# Patient Record
Sex: FEMALE | Race: WHITE | NOT HISPANIC OR LATINO | Employment: FULL TIME | URBAN - METROPOLITAN AREA
[De-identification: names, ages, dates, MRNs, and addresses within clinical notes are randomized per-mention and may not be internally consistent; named-entity substitution may affect disease eponyms.]

---

## 2023-07-17 ENCOUNTER — APPOINTMENT (OUTPATIENT)
Dept: PRIMARY CARE | Facility: CLINIC | Age: 38
End: 2023-07-17
Payer: COMMERCIAL

## 2023-07-28 LAB
ALANINE AMINOTRANSFERASE (SGPT) (U/L) IN SER/PLAS: 22 U/L (ref 7–45)
ALBUMIN (G/DL) IN SER/PLAS: 4 G/DL (ref 3.4–5)
ALKALINE PHOSPHATASE (U/L) IN SER/PLAS: 81 U/L (ref 33–110)
ANION GAP IN SER/PLAS: 12 MMOL/L (ref 10–20)
ASPARTATE AMINOTRANSFERASE (SGOT) (U/L) IN SER/PLAS: 16 U/L (ref 9–39)
BILIRUBIN TOTAL (MG/DL) IN SER/PLAS: 0.3 MG/DL (ref 0–1.2)
CALCIDIOL (25 OH VITAMIN D3) (NG/ML) IN SER/PLAS: 54 NG/ML
CALCIUM (MG/DL) IN SER/PLAS: 9.2 MG/DL (ref 8.6–10.6)
CARBON DIOXIDE, TOTAL (MMOL/L) IN SER/PLAS: 25 MMOL/L (ref 21–32)
CHLORIDE (MMOL/L) IN SER/PLAS: 106 MMOL/L (ref 98–107)
CHOLESTEROL (MG/DL) IN SER/PLAS: 202 MG/DL (ref 0–199)
CHOLESTEROL IN HDL (MG/DL) IN SER/PLAS: 45.5 MG/DL
CHOLESTEROL/HDL RATIO: 4.4
CREATININE (MG/DL) IN SER/PLAS: 0.9 MG/DL (ref 0.5–1.05)
ERYTHROCYTE DISTRIBUTION WIDTH (RATIO) BY AUTOMATED COUNT: 13.2 % (ref 11.5–14.5)
ERYTHROCYTE MEAN CORPUSCULAR HEMOGLOBIN CONCENTRATION (G/DL) BY AUTOMATED: 31.8 G/DL (ref 32–36)
ERYTHROCYTE MEAN CORPUSCULAR VOLUME (FL) BY AUTOMATED COUNT: 87 FL (ref 80–100)
ERYTHROCYTES (10*6/UL) IN BLOOD BY AUTOMATED COUNT: 4.74 X10E12/L (ref 4–5.2)
GFR FEMALE: 85 ML/MIN/1.73M2
GLUCOSE (MG/DL) IN SER/PLAS: 80 MG/DL (ref 74–99)
HEMATOCRIT (%) IN BLOOD BY AUTOMATED COUNT: 41.2 % (ref 36–46)
HEMOGLOBIN (G/DL) IN BLOOD: 13.1 G/DL (ref 12–16)
LDL: 130 MG/DL (ref 0–99)
LEUKOCYTES (10*3/UL) IN BLOOD BY AUTOMATED COUNT: 8.9 X10E9/L (ref 4.4–11.3)
NRBC (PER 100 WBCS) BY AUTOMATED COUNT: 0 /100 WBC (ref 0–0)
PLATELETS (10*3/UL) IN BLOOD AUTOMATED COUNT: 315 X10E9/L (ref 150–450)
POTASSIUM (MMOL/L) IN SER/PLAS: 4.5 MMOL/L (ref 3.5–5.3)
PROTEIN TOTAL: 7 G/DL (ref 6.4–8.2)
SODIUM (MMOL/L) IN SER/PLAS: 138 MMOL/L (ref 136–145)
THYROTROPIN (MIU/L) IN SER/PLAS BY DETECTION LIMIT <= 0.05 MIU/L: 3.4 MIU/L (ref 0.44–3.98)
TRIGLYCERIDE (MG/DL) IN SER/PLAS: 131 MG/DL (ref 0–149)
UREA NITROGEN (MG/DL) IN SER/PLAS: 16 MG/DL (ref 6–23)
VLDL: 26 MG/DL (ref 0–40)

## 2023-08-01 ENCOUNTER — OFFICE VISIT (OUTPATIENT)
Dept: PRIMARY CARE | Facility: CLINIC | Age: 38
End: 2023-08-01
Payer: COMMERCIAL

## 2023-08-01 VITALS
DIASTOLIC BLOOD PRESSURE: 71 MMHG | SYSTOLIC BLOOD PRESSURE: 117 MMHG | WEIGHT: 268.4 LBS | HEART RATE: 72 BPM | BODY MASS INDEX: 44.72 KG/M2 | HEIGHT: 65 IN | OXYGEN SATURATION: 97 %

## 2023-08-01 DIAGNOSIS — E78.00 HYPERCHOLESTEROLEMIA: ICD-10-CM

## 2023-08-01 DIAGNOSIS — E03.9 HYPOTHYROIDISM, UNSPECIFIED TYPE: Primary | ICD-10-CM

## 2023-08-01 DIAGNOSIS — F41.1 GENERALIZED ANXIETY DISORDER: ICD-10-CM

## 2023-08-01 PROBLEM — F43.0 ACUTE REACTION TO STRESS: Status: ACTIVE | Noted: 2023-08-01

## 2023-08-01 PROBLEM — E66.9 OBESITY: Status: ACTIVE | Noted: 2023-08-01

## 2023-08-01 PROBLEM — L30.9 ECZEMA: Status: ACTIVE | Noted: 2023-08-01

## 2023-08-01 PROBLEM — R03.0 ELEVATED BLOOD PRESSURE READING WITHOUT DIAGNOSIS OF HYPERTENSION: Status: ACTIVE | Noted: 2023-08-01

## 2023-08-01 PROBLEM — F98.8 ATTENTION DEFICIT DISORDER WITHOUT HYPERACTIVITY: Status: ACTIVE | Noted: 2023-08-01

## 2023-08-01 PROBLEM — G47.00 INSOMNIA: Status: ACTIVE | Noted: 2023-08-01

## 2023-08-01 PROBLEM — K21.9 ESOPHAGEAL REFLUX: Status: ACTIVE | Noted: 2023-08-01

## 2023-08-01 PROCEDURE — 99214 OFFICE O/P EST MOD 30 MIN: CPT | Performed by: INTERNAL MEDICINE

## 2023-08-01 PROCEDURE — 1036F TOBACCO NON-USER: CPT | Performed by: INTERNAL MEDICINE

## 2023-08-01 RX ORDER — LEVOTHYROXINE SODIUM 125 UG/1
125 TABLET ORAL DAILY
Qty: 90 TABLET | Refills: 3 | Status: SHIPPED | OUTPATIENT
Start: 2023-08-01 | End: 2024-04-09 | Stop reason: SDUPTHER

## 2023-08-01 RX ORDER — ESCITALOPRAM OXALATE 10 MG/1
10 TABLET ORAL
COMMUNITY
Start: 2020-04-01 | End: 2023-08-01 | Stop reason: SDUPTHER

## 2023-08-01 RX ORDER — LEVOTHYROXINE SODIUM 125 UG/1
125 TABLET ORAL DAILY
COMMUNITY
End: 2023-08-01 | Stop reason: SDUPTHER

## 2023-08-01 RX ORDER — CLOTRIMAZOLE AND BETAMETHASONE DIPROPIONATE 10; .64 MG/G; MG/G
CREAM TOPICAL 2 TIMES DAILY
COMMUNITY
Start: 2022-07-27

## 2023-08-01 RX ORDER — ESCITALOPRAM OXALATE 10 MG/1
10 TABLET ORAL DAILY
Qty: 90 TABLET | Refills: 3 | Status: SHIPPED | OUTPATIENT
Start: 2023-08-01 | End: 2024-04-09 | Stop reason: SDUPTHER

## 2023-08-01 RX ORDER — MODERNA COVID-19 VACCINE, BIVALENT 25; 25 UG/.5ML; UG/.5ML
INJECTION, SUSPENSION INTRAMUSCULAR
COMMUNITY
Start: 2022-10-24 | End: 2023-08-01 | Stop reason: ALTCHOICE

## 2023-08-01 RX ORDER — ERGOCALCIFEROL (VITAMIN D2) 200 MCG/ML
DROPS ORAL
COMMUNITY

## 2023-08-01 ASSESSMENT — ENCOUNTER SYMPTOMS
DIFFICULTY URINATING: 0
DYSURIA: 0
DIZZINESS: 0
NAUSEA: 0
VOMITING: 0
FEVER: 0
HEMATURIA: 0
FATIGUE: 0
CONSTIPATION: 0
SORE THROAT: 0
DIARRHEA: 0
ARTHRALGIAS: 0
WEAKNESS: 0
LIGHT-HEADEDNESS: 0
PALPITATIONS: 0
FREQUENCY: 0
SHORTNESS OF BREATH: 0
HEADACHES: 0
CHILLS: 0
MYALGIAS: 0
COUGH: 0

## 2023-08-01 ASSESSMENT — PATIENT HEALTH QUESTIONNAIRE - PHQ9
SUM OF ALL RESPONSES TO PHQ9 QUESTIONS 1 AND 2: 0
1. LITTLE INTEREST OR PLEASURE IN DOING THINGS: NOT AT ALL
2. FEELING DOWN, DEPRESSED OR HOPELESS: NOT AT ALL

## 2023-08-01 ASSESSMENT — PAIN SCALES - GENERAL: PAINLEVEL: 0-NO PAIN

## 2023-08-01 NOTE — ASSESSMENT & PLAN NOTE
TSH is stable and unchanged so she will stay on the levothyroxine 125 mcg daily.  She was given refill.

## 2023-08-01 NOTE — PROGRESS NOTES
Subjective   Patient ID: Kylee Garza is a 37 y.o. female who presents for 6 month follow-up for med refill and thyroid management.  BN    Patient is here for routine follow-up for her thyroid disease, anxiety and medication management.  Overall patient feels like she is doing well and has no new complaints.  Complete blood work was just finished and reviewed with the patient in person today.  Patient's LDL cholesterol is 130 and she continues to work on her diet.  She has not lost any weight but her cholesterol levels are clearly improving so I encouraged her to work on both weight loss and her low-fat diet.        Review of Systems   Constitutional:  Negative for chills, fatigue and fever.   HENT:  Negative for sore throat.    Eyes:  Negative for visual disturbance.   Respiratory:  Negative for cough and shortness of breath.    Cardiovascular:  Negative for chest pain, palpitations and leg swelling.   Gastrointestinal:  Negative for constipation, diarrhea, nausea and vomiting.   Genitourinary:  Negative for difficulty urinating, dysuria, frequency, hematuria and urgency.   Musculoskeletal:  Negative for arthralgias and myalgias.   Skin:  Negative for rash.   Neurological:  Negative for dizziness, syncope, weakness, light-headedness and headaches.       Objective   Medication Documentation Review Audit       Reviewed by Sofiya Almonte MD (Physician) on 08/01/23 at 0839      Medication Order Taking? Sig Documenting Provider Last Dose Status   BIOTIN ORAL [id number]  Take by mouth. Historical Provider, MD  Active   clotrimazole-betamethasone (Lotrisone) cream [id number] Yes twice a day. Historical Provider, MD  Active   collagen, hydrolysate, bovine, (COLLAGEN, HYDR, BOVINE,, BULK, MISC) [id number]  Take by mouth. Historical Provider, MD  Active   ergocalciferol (Vitamin D-2) 200 mcg/mL (8,000 unit/mL) drops [id number]  Take by mouth. Historical Provider, MD  Active   escitalopram (Lexapro) 10 mg tablet [id number] Yes 1  "tablet (10 mg). Historical Provider, MD  Active   levothyroxine (Synthroid, Levoxyl) 125 mcg tablet [id number]  Take 1 tablet (125 mcg) by mouth once daily. Historical Provider, MD  Active     Discontinued 08/01/23 0839                  Physical Exam  Constitutional:       Appearance: Normal appearance.   HENT:      Head: Normocephalic and atraumatic.      Nose: Nose normal.   Eyes:      Extraocular Movements: Extraocular movements intact.      Pupils: Pupils are equal, round, and reactive to light.   Cardiovascular:      Rate and Rhythm: Normal rate and regular rhythm.   Pulmonary:      Breath sounds: Normal breath sounds.   Abdominal:      General: Abdomen is flat. Bowel sounds are normal.      Palpations: Abdomen is soft.   Musculoskeletal:      Right lower leg: No edema.      Left lower leg: No edema.   Neurological:      Mental Status: She is alert.     /71 (BP Location: Left arm, Patient Position: Sitting)   Pulse 72   Ht 1.638 m (5' 4.5\")   Wt 122 kg (268 lb 6.4 oz)   SpO2 97%   BMI 45.36 kg/m²       Assessment/Plan   Problem List Items Addressed This Visit       Generalized anxiety disorder     Patient states that her anxiety is stable on the S-Citalopram and she was given a refill.         Relevant Medications    escitalopram (Lexapro) 10 mg tablet    Hypercholesterolemia     Patient will continue to work on diet.  Her LDL cholesterol is down to 130 which is improved but still elevated.  She is aware she needs to continue working on dietary changes         Hypothyroidism - Primary     TSH is stable and unchanged so she will stay on the levothyroxine 125 mcg daily.  She was given refill.         Relevant Medications    levothyroxine (Synthroid, Levoxyl) 125 mcg tablet              It has been a pleasure seeing you.    "

## 2023-08-01 NOTE — ASSESSMENT & PLAN NOTE
Patient will continue to work on diet.  Her LDL cholesterol is down to 130 which is improved but still elevated.  She is aware she needs to continue working on dietary changes

## 2024-02-16 ENCOUNTER — APPOINTMENT (OUTPATIENT)
Dept: PRIMARY CARE | Facility: CLINIC | Age: 39
End: 2024-02-16
Payer: COMMERCIAL

## 2024-03-06 ENCOUNTER — APPOINTMENT (OUTPATIENT)
Dept: PRIMARY CARE | Facility: CLINIC | Age: 39
End: 2024-03-06
Payer: COMMERCIAL

## 2024-03-11 ENCOUNTER — TELEPHONE (OUTPATIENT)
Dept: PRIMARY CARE | Facility: CLINIC | Age: 39
End: 2024-03-11
Payer: COMMERCIAL

## 2024-04-09 ENCOUNTER — OFFICE VISIT (OUTPATIENT)
Dept: PRIMARY CARE | Facility: CLINIC | Age: 39
End: 2024-04-09
Payer: COMMERCIAL

## 2024-04-09 VITALS
BODY MASS INDEX: 45.02 KG/M2 | WEIGHT: 270.2 LBS | HEIGHT: 65 IN | DIASTOLIC BLOOD PRESSURE: 84 MMHG | SYSTOLIC BLOOD PRESSURE: 120 MMHG | HEART RATE: 67 BPM | OXYGEN SATURATION: 97 %

## 2024-04-09 DIAGNOSIS — K21.9 GASTROESOPHAGEAL REFLUX DISEASE WITHOUT ESOPHAGITIS: ICD-10-CM

## 2024-04-09 DIAGNOSIS — E03.9 HYPOTHYROIDISM, UNSPECIFIED TYPE: ICD-10-CM

## 2024-04-09 DIAGNOSIS — E55.9 VITAMIN D DEFICIENCY: ICD-10-CM

## 2024-04-09 DIAGNOSIS — E78.00 HYPERCHOLESTEROLEMIA: Primary | ICD-10-CM

## 2024-04-09 DIAGNOSIS — Z00.00 ANNUAL PHYSICAL EXAM: ICD-10-CM

## 2024-04-09 DIAGNOSIS — F41.1 GENERALIZED ANXIETY DISORDER: ICD-10-CM

## 2024-04-09 PROCEDURE — 99214 OFFICE O/P EST MOD 30 MIN: CPT | Performed by: INTERNAL MEDICINE

## 2024-04-09 PROCEDURE — 1036F TOBACCO NON-USER: CPT | Performed by: INTERNAL MEDICINE

## 2024-04-09 RX ORDER — LEVOTHYROXINE SODIUM 125 UG/1
125 TABLET ORAL DAILY
Qty: 90 TABLET | Refills: 3 | Status: SHIPPED | OUTPATIENT
Start: 2024-04-09

## 2024-04-09 RX ORDER — ESCITALOPRAM OXALATE 10 MG/1
10 TABLET ORAL DAILY
Qty: 90 TABLET | Refills: 3 | Status: SHIPPED | OUTPATIENT
Start: 2024-04-09

## 2024-04-09 ASSESSMENT — ENCOUNTER SYMPTOMS
CONSTIPATION: 0
COUGH: 0
DIZZINESS: 0
FREQUENCY: 0
DYSURIA: 0
VOMITING: 0
DIARRHEA: 0
SHORTNESS OF BREATH: 0
NAUSEA: 0
FATIGUE: 0
FEVER: 0
SORE THROAT: 0
DIFFICULTY URINATING: 0
HEADACHES: 0
WEAKNESS: 0
CHILLS: 0
MYALGIAS: 0
LIGHT-HEADEDNESS: 0
HEMATURIA: 0
CHEST TIGHTNESS: 0
PALPITATIONS: 0
ARTHRALGIAS: 0

## 2024-04-09 ASSESSMENT — PAIN SCALES - GENERAL: PAINLEVEL: 0-NO PAIN

## 2024-04-09 NOTE — PROGRESS NOTES
Subjective   Patient ID: Kylee Garza is a 38 y.o. female who presents for No chief complaint on file..  Kylee is here for a 6 month follow up for hypothyroidism and anxiety. She is doing very well today and has no concerns at this time. She recently had Lasik eye surgery in January and is happy with the results. Patient regularly sees her OBGYN Dr Tucker through [facility] in [place].         Review of Systems   Constitutional:  Negative for chills, fatigue and fever.   HENT:  Negative for sore throat.    Eyes:  Negative for visual disturbance.   Respiratory:  Negative for cough, chest tightness and shortness of breath.    Cardiovascular:  Negative for chest pain, palpitations and leg swelling.   Gastrointestinal:  Negative for constipation, diarrhea, nausea and vomiting.   Genitourinary:  Negative for difficulty urinating, dysuria, frequency, hematuria and urgency.   Musculoskeletal:  Negative for arthralgias and myalgias.   Skin:  Negative for rash.   Neurological:  Negative for dizziness, syncope, weakness, light-headedness and headaches.       Objective   Medication Documentation Review Audit       Reviewed by Sofiya Almonte MD (Physician) on 04/09/24 at 1520      Medication Order Taking? Sig Documenting Provider Last Dose Status   BIOTIN ORAL [id number]  Take by mouth. Historical Provider, MD  Active   clotrimazole-betamethasone (Lotrisone) cream [id number]  twice a day. Historical Provider, MD  Active   collagen, hydrolysate, bovine, (COLLAGEN, HYDR, BOVINE,, BULK, MISC) [id number]  Take by mouth. Historical Provider, MD  Active   ergocalciferol (Vitamin D-2) 200 mcg/mL (8,000 unit/mL) drops [id number]  Take by mouth. Historical Provider, MD  Active   escitalopram (Lexapro) 10 mg tablet [id number]  Take 1 tablet (10 mg) by mouth once daily. Sofiya Almonte MD  Active   levothyroxine (Synthroid, Levoxyl) 125 mcg tablet [id number]  Take 1 tablet (125 mcg) by mouth once daily. Sofiya Almonte MD  Active                  No  "Known Allergies  Physical Exam  Constitutional:       General: She is not in acute distress.     Appearance: Normal appearance. She is obese. She is not ill-appearing.   HENT:      Head: Normocephalic and atraumatic.      Nose: Nose normal.      Mouth/Throat:      Mouth: Mucous membranes are moist.   Eyes:      Extraocular Movements: Extraocular movements intact.      Conjunctiva/sclera: Conjunctivae normal.      Pupils: Pupils are equal, round, and reactive to light.   Cardiovascular:      Rate and Rhythm: Normal rate and regular rhythm.      Pulses: Normal pulses.      Heart sounds: Normal heart sounds.   Pulmonary:      Effort: Pulmonary effort is normal.      Breath sounds: Normal breath sounds.   Abdominal:      General: Abdomen is flat. Bowel sounds are normal.      Palpations: Abdomen is soft.   Musculoskeletal:      Right lower leg: No edema.      Left lower leg: No edema.   Skin:     General: Skin is warm.   Neurological:      General: No focal deficit present.      Mental Status: She is alert and oriented to person, place, and time. Mental status is at baseline.   Psychiatric:         Mood and Affect: Mood normal.         Behavior: Behavior normal.         Thought Content: Thought content normal.         Judgment: Judgment normal.     /84 (BP Location: Left arm, Patient Position: Sitting)   Pulse 67   Ht 1.651 m (5' 5\") Comment: with shoes on  Wt 123 kg (270 lb 3.2 oz)   SpO2 97%   BMI 44.96 kg/m²       Assessment/Plan   Problem List Items Addressed This Visit       Esophageal reflux     Patient is no longer having symptoms now that she has a less stressful job.  She denies any reflux or heartburn         Generalized anxiety disorder     Stable and well-controlled on lexapro 10 mg daily. Refill sent today          Relevant Medications    escitalopram (Lexapro) 10 mg tablet    Hypercholesterolemia - Primary     Fasting labs to be drawn after July 23. Continue low fat diet and exercising " regularly.   Cholesterol controlled without medication at this time.          Relevant Orders    Lipid panel    Hypothyroidism     Stable on Synthroid. TSH to be drawn after July 23, 2024.   Refill sent today.          Relevant Medications    levothyroxine (Synthroid, Levoxyl) 125 mcg tablet     Other Visit Diagnoses       Annual physical exam        Relevant Orders    CBC    Comprehensive metabolic panel    Tsh With Reflex To Free T4 If Abnormal    Lipid panel    Vitamin D deficiency        Relevant Orders    Vitamin D 25-Hydroxy,Total (for eval of Vitamin D levels)          Please follow up with Dr Almonte in 6 months for follow up hypothyroidism, anxiety, and cholesterol.   Please get fasting labs after July 23.          It has been a pleasure seeing you.  Sofiya Almonte MD

## 2024-04-09 NOTE — ASSESSMENT & PLAN NOTE
Patient is no longer having symptoms now that she has a less stressful job.  She denies any reflux or heartburn

## 2024-04-09 NOTE — ASSESSMENT & PLAN NOTE
Fasting labs to be drawn after July 23. Continue low fat diet and exercising regularly.   Cholesterol controlled without medication at this time.

## 2024-10-08 ENCOUNTER — APPOINTMENT (OUTPATIENT)
Dept: PRIMARY CARE | Facility: CLINIC | Age: 39
End: 2024-10-08
Payer: COMMERCIAL

## 2024-10-10 ENCOUNTER — APPOINTMENT (OUTPATIENT)
Dept: PRIMARY CARE | Facility: CLINIC | Age: 39
End: 2024-10-10
Payer: COMMERCIAL

## 2024-10-10 VITALS
OXYGEN SATURATION: 98 % | BODY MASS INDEX: 38.19 KG/M2 | RESPIRATION RATE: 16 BRPM | HEIGHT: 65 IN | SYSTOLIC BLOOD PRESSURE: 107 MMHG | DIASTOLIC BLOOD PRESSURE: 77 MMHG | HEART RATE: 74 BPM | WEIGHT: 229.2 LBS

## 2024-10-10 DIAGNOSIS — E78.00 HYPERCHOLESTEROLEMIA: ICD-10-CM

## 2024-10-10 DIAGNOSIS — E03.9 HYPOTHYROIDISM, UNSPECIFIED TYPE: ICD-10-CM

## 2024-10-10 DIAGNOSIS — Z00.00 ANNUAL PHYSICAL EXAM: Primary | ICD-10-CM

## 2024-10-10 DIAGNOSIS — Z23 FLU VACCINE NEED: ICD-10-CM

## 2024-10-10 DIAGNOSIS — F41.1 GENERALIZED ANXIETY DISORDER: ICD-10-CM

## 2024-10-10 PROCEDURE — 3008F BODY MASS INDEX DOCD: CPT | Performed by: INTERNAL MEDICINE

## 2024-10-10 PROCEDURE — 90471 IMMUNIZATION ADMIN: CPT | Performed by: INTERNAL MEDICINE

## 2024-10-10 PROCEDURE — 90656 IIV3 VACC NO PRSV 0.5 ML IM: CPT | Performed by: INTERNAL MEDICINE

## 2024-10-10 PROCEDURE — 1036F TOBACCO NON-USER: CPT | Performed by: INTERNAL MEDICINE

## 2024-10-10 PROCEDURE — 99214 OFFICE O/P EST MOD 30 MIN: CPT | Performed by: INTERNAL MEDICINE

## 2024-10-10 ASSESSMENT — ENCOUNTER SYMPTOMS
ARTHRALGIAS: 0
FEVER: 0
SORE THROAT: 0
ABDOMINAL PAIN: 0
LIGHT-HEADEDNESS: 0
COUGH: 0
DYSURIA: 0
DIARRHEA: 0
TROUBLE SWALLOWING: 0
FATIGUE: 0
FREQUENCY: 0
DIZZINESS: 0
CONSTIPATION: 0
NAUSEA: 0
VOMITING: 0
SHORTNESS OF BREATH: 0
PALPITATIONS: 0

## 2024-10-10 ASSESSMENT — PATIENT HEALTH QUESTIONNAIRE - PHQ9
1. LITTLE INTEREST OR PLEASURE IN DOING THINGS: NOT AT ALL
SUM OF ALL RESPONSES TO PHQ9 QUESTIONS 1 AND 2: 0
2. FEELING DOWN, DEPRESSED OR HOPELESS: NOT AT ALL

## 2024-10-10 ASSESSMENT — PAIN SCALES - GENERAL: PAINLEVEL: 0-NO PAIN

## 2024-10-10 NOTE — ASSESSMENT & PLAN NOTE
Patient's cholesterol is due in April.  She was given a requisition and will have fasting labs before her next appointment in 6 months.

## 2024-10-10 NOTE — PATIENT INSTRUCTIONS
Follow up Dr Almonte in 6 months 30 min appointment    Get fasting labs before next appointment

## 2024-10-10 NOTE — PROGRESS NOTES
"Subjective   Patient ID: Kylee Garza is a 38 y.o. female who presents for No chief complaint on file..  Patient is here for routine follow-up on her cholesterol thyroid disease reflux and medication management.  She feels well overall and has no new complaints.          Review of Systems   Constitutional:  Negative for fatigue and fever.   HENT:  Negative for sore throat and trouble swallowing.    Eyes:  Negative for visual disturbance.   Respiratory:  Negative for cough and shortness of breath.    Cardiovascular:  Negative for chest pain, palpitations and leg swelling.   Gastrointestinal:  Negative for abdominal pain, constipation, diarrhea, nausea and vomiting.   Genitourinary:  Negative for dysuria and frequency.   Musculoskeletal:  Negative for arthralgias.   Skin:  Negative for rash.   Neurological:  Negative for dizziness and light-headedness.       Objective   Medication Documentation Review Audit       Reviewed by Sofiya Almonte MD (Physician) on 10/10/24 at 1029      Medication Order Taking? Sig Documenting Provider Last Dose Status   BIOTIN ORAL [id number]  Take by mouth. Historical Provider, MD  Active   clotrimazole-betamethasone (Lotrisone) cream [id number]  twice a day. Historical Provider, MD  Active   collagen, hydrolysate, bovine, (COLLAGEN, HYDR, BOVINE,, BULK, MISC) [id number]  Take by mouth. Historical Provider, MD  Active   ergocalciferol (Vitamin D-2) 200 mcg/mL (8,000 unit/mL) drops [id number]  Take by mouth. Historical Provider, MD  Active   escitalopram (Lexapro) 10 mg tablet [id number]  Take 1 tablet (10 mg) by mouth once daily. Sofiya Almonte MD  Active   levothyroxine (Synthroid, Levoxyl) 125 mcg tablet [id number]  Take 1 tablet (125 mcg) by mouth once daily. Sofiya Almonte MD  Active                  No Known Allergies    /77   Pulse 74   Resp 16   Ht 1.651 m (5' 5\")   Wt 104 kg (229 lb 3.2 oz)   SpO2 98%   BMI 38.14 kg/m²     Physical Exam  Constitutional:       Appearance: " Normal appearance.   HENT:      Head: Normocephalic and atraumatic.      Nose: Nose normal.   Eyes:      Extraocular Movements: Extraocular movements intact.      Pupils: Pupils are equal, round, and reactive to light.   Cardiovascular:      Rate and Rhythm: Normal rate and regular rhythm.   Pulmonary:      Breath sounds: Normal breath sounds.   Abdominal:      General: Abdomen is flat. Bowel sounds are normal.      Palpations: Abdomen is soft.   Musculoskeletal:      Right lower leg: No edema.      Left lower leg: No edema.   Neurological:      Mental Status: She is alert.           Assessment/Plan   Problem List Items Addressed This Visit       Generalized anxiety disorder     Patient's anxiety is stable on the Lexapro 10 mg daily and she does not want to change it.         Hypercholesterolemia     Patient's cholesterol is due in April.  She was given a requisition and will have fasting labs before her next appointment in 6 months.         Hypothyroidism     Patient is still stable on her levothyroxine 125 mcg daily.  Annual labs are due in April 2025          Other Visit Diagnoses       Annual physical exam    -  Primary    Relevant Orders    Lipid Panel    CBC    Comprehensive Metabolic Panel    TSH with reflex to Free T4 if abnormal    Vitamin D 25-Hydroxy,Total (for eval of Vitamin D levels)    Flu vaccine need        Relevant Orders    Flu vaccine, trivalent, preservative free, age 6 months and greater (Fluarix/Fluzone/Flulaval) (Completed)          Patient will get a flu shot today.  She will follow-up with me in 6 months and get annual blood work prior to that appointment  Patient continues to see GYN Dr. Marks         It has been a pleasure seeing you.  Sofiya Almonte MD

## 2025-01-15 DIAGNOSIS — T75.3XXA MOTION SICKNESS, INITIAL ENCOUNTER: Primary | ICD-10-CM

## 2025-01-15 RX ORDER — ONDANSETRON 8 MG/1
8 TABLET, ORALLY DISINTEGRATING ORAL EVERY 8 HOURS PRN
Qty: 20 TABLET | Refills: 0 | Status: SHIPPED | OUTPATIENT
Start: 2025-01-15 | End: 2025-01-22

## 2025-03-31 DIAGNOSIS — E03.9 HYPOTHYROIDISM, UNSPECIFIED TYPE: ICD-10-CM

## 2025-04-06 RX ORDER — LEVOTHYROXINE SODIUM 125 UG/1
125 TABLET ORAL DAILY
Qty: 90 TABLET | Refills: 4 | OUTPATIENT
Start: 2025-04-06

## 2025-04-09 LAB
25(OH)D3+25(OH)D2 SERPL-MCNC: 59 NG/ML (ref 30–100)
ALBUMIN SERPL-MCNC: 4.2 G/DL (ref 3.6–5.1)
ALP SERPL-CCNC: 78 U/L (ref 31–125)
ALT SERPL-CCNC: 8 U/L (ref 6–29)
ANION GAP SERPL CALCULATED.4IONS-SCNC: 8 MMOL/L (CALC) (ref 7–17)
AST SERPL-CCNC: 9 U/L (ref 10–30)
BILIRUB SERPL-MCNC: 0.4 MG/DL (ref 0.2–1.2)
BUN SERPL-MCNC: 15 MG/DL (ref 7–25)
CALCIUM SERPL-MCNC: 8.9 MG/DL (ref 8.6–10.2)
CHLORIDE SERPL-SCNC: 105 MMOL/L (ref 98–110)
CHOLEST SERPL-MCNC: 242 MG/DL
CHOLEST/HDLC SERPL: 4.8 (CALC)
CO2 SERPL-SCNC: 25 MMOL/L (ref 20–32)
CREAT SERPL-MCNC: 0.84 MG/DL (ref 0.5–0.97)
EGFRCR SERPLBLD CKD-EPI 2021: 92 ML/MIN/1.73M2
ERYTHROCYTE [DISTWIDTH] IN BLOOD BY AUTOMATED COUNT: 14.1 % (ref 11–15)
GLUCOSE SERPL-MCNC: 83 MG/DL (ref 65–99)
HCT VFR BLD AUTO: 43.5 % (ref 35–45)
HDLC SERPL-MCNC: 50 MG/DL
HGB BLD-MCNC: 13.9 G/DL (ref 11.7–15.5)
LDLC SERPL CALC-MCNC: 171 MG/DL (CALC)
MCH RBC QN AUTO: 26.8 PG (ref 27–33)
MCHC RBC AUTO-ENTMCNC: 32 G/DL (ref 32–36)
MCV RBC AUTO: 83.8 FL (ref 80–100)
NONHDLC SERPL-MCNC: 192 MG/DL (CALC)
PLATELET # BLD AUTO: 303 THOUSAND/UL (ref 140–400)
PMV BLD REES-ECKER: 10.7 FL (ref 7.5–12.5)
POTASSIUM SERPL-SCNC: 4.3 MMOL/L (ref 3.5–5.3)
PROT SERPL-MCNC: 7 G/DL (ref 6.1–8.1)
RBC # BLD AUTO: 5.19 MILLION/UL (ref 3.8–5.1)
SODIUM SERPL-SCNC: 138 MMOL/L (ref 135–146)
TRIGL SERPL-MCNC: 95 MG/DL
TSH SERPL-ACNC: 2.25 MIU/L
WBC # BLD AUTO: 7.6 THOUSAND/UL (ref 3.8–10.8)

## 2025-04-10 ENCOUNTER — APPOINTMENT (OUTPATIENT)
Dept: PRIMARY CARE | Facility: CLINIC | Age: 40
End: 2025-04-10
Payer: COMMERCIAL

## 2025-04-10 ENCOUNTER — TELEPHONE (OUTPATIENT)
Dept: PRIMARY CARE | Facility: CLINIC | Age: 40
End: 2025-04-10

## 2025-04-10 NOTE — TELEPHONE ENCOUNTER
----- Message from Sofiya Almonte sent at 4/10/2025  8:06 AM EDT -----  Notify patient her cholesterol specific LDL is quite elevated 171.  Please encourage her to work on a low-fat diet.  Other labs all looked okay and/or were normal and I will review all of this with her at her appointment later this month

## 2025-04-17 ENCOUNTER — APPOINTMENT (OUTPATIENT)
Dept: PRIMARY CARE | Facility: CLINIC | Age: 40
End: 2025-04-17
Payer: COMMERCIAL

## 2025-04-17 VITALS
SYSTOLIC BLOOD PRESSURE: 124 MMHG | OXYGEN SATURATION: 97 % | BODY MASS INDEX: 38.19 KG/M2 | WEIGHT: 229.2 LBS | HEART RATE: 67 BPM | HEIGHT: 65 IN | DIASTOLIC BLOOD PRESSURE: 76 MMHG

## 2025-04-17 DIAGNOSIS — F98.8 ATTENTION DEFICIT DISORDER WITHOUT HYPERACTIVITY: ICD-10-CM

## 2025-04-17 DIAGNOSIS — E78.00 HYPERCHOLESTEROLEMIA: ICD-10-CM

## 2025-04-17 DIAGNOSIS — E78.5 HYPERLIPIDEMIA, UNSPECIFIED HYPERLIPIDEMIA TYPE: ICD-10-CM

## 2025-04-17 DIAGNOSIS — K21.9 GASTROESOPHAGEAL REFLUX DISEASE WITHOUT ESOPHAGITIS: ICD-10-CM

## 2025-04-17 DIAGNOSIS — E03.9 HYPOTHYROIDISM, UNSPECIFIED TYPE: ICD-10-CM

## 2025-04-17 DIAGNOSIS — F41.1 GENERALIZED ANXIETY DISORDER: ICD-10-CM

## 2025-04-17 DIAGNOSIS — L30.9 ECZEMA, UNSPECIFIED TYPE: Primary | ICD-10-CM

## 2025-04-17 PROCEDURE — 1036F TOBACCO NON-USER: CPT | Performed by: INTERNAL MEDICINE

## 2025-04-17 PROCEDURE — 99214 OFFICE O/P EST MOD 30 MIN: CPT | Performed by: INTERNAL MEDICINE

## 2025-04-17 PROCEDURE — 3008F BODY MASS INDEX DOCD: CPT | Performed by: INTERNAL MEDICINE

## 2025-04-17 RX ORDER — ESCITALOPRAM OXALATE 10 MG/1
10 TABLET ORAL DAILY
Qty: 90 TABLET | Refills: 3 | Status: SHIPPED | OUTPATIENT
Start: 2025-04-17

## 2025-04-17 RX ORDER — ATOMOXETINE 40 MG/1
40 CAPSULE ORAL DAILY
Qty: 30 CAPSULE | Refills: 1 | Status: SHIPPED | OUTPATIENT
Start: 2025-04-17 | End: 2025-05-17

## 2025-04-17 RX ORDER — LEVOTHYROXINE SODIUM 125 UG/1
125 TABLET ORAL DAILY
Qty: 90 TABLET | Refills: 3 | Status: SHIPPED | OUTPATIENT
Start: 2025-04-17

## 2025-04-17 RX ORDER — CLOTRIMAZOLE AND BETAMETHASONE DIPROPIONATE 10; .64 MG/G; MG/G
CREAM TOPICAL 2 TIMES DAILY
Qty: 45 G | Refills: 3 | Status: SHIPPED | OUTPATIENT
Start: 2025-04-17

## 2025-04-17 ASSESSMENT — ENCOUNTER SYMPTOMS
COUGH: 0
VOMITING: 0
FREQUENCY: 0
ARTHRALGIAS: 0
DIZZINESS: 0
NAUSEA: 0
SORE THROAT: 0
CONSTIPATION: 0
DIARRHEA: 0
TROUBLE SWALLOWING: 0
FEVER: 0
ABDOMINAL PAIN: 0
SHORTNESS OF BREATH: 0
DYSURIA: 0
LIGHT-HEADEDNESS: 0
FATIGUE: 0
PALPITATIONS: 0

## 2025-04-17 ASSESSMENT — PATIENT HEALTH QUESTIONNAIRE - PHQ9
2. FEELING DOWN, DEPRESSED OR HOPELESS: NOT AT ALL
1. LITTLE INTEREST OR PLEASURE IN DOING THINGS: NOT AT ALL
SUM OF ALL RESPONSES TO PHQ9 QUESTIONS 1 AND 2: 0

## 2025-04-17 ASSESSMENT — PAIN SCALES - GENERAL: PAINLEVEL_OUTOF10: 0-NO PAIN

## 2025-04-17 NOTE — ASSESSMENT & PLAN NOTE
Patient is going back to school in August to finish her bachelor's and get her master's degree. She was previously on Adderall 20 years ago while in school. Patient was started on Strattera 40 mg daily to help her while she is in school. She will follow up in 1 month to see how she is doing on this medication.

## 2025-04-17 NOTE — PROGRESS NOTES
Subjective   Patient ID: Kylee Garza is a 39 y.o. female who presents for a 6 month follow-up on hypercholesterolemia, thyroid and GERD.   Patient presents to the office today for 6 month follow up on her hypothyroidism, cholesterol, and GERD management.     Patient does not have any acute concerns at this time.    Annual labs were reviwed with the patient during today's visit. Her LDL is currently 171, with a total cholesterol of 242. A CT cardiac calcium scan was ordered to further assess the patient. Patient was encouraged to continue working on a low-fat diet and cutting back on her intake of dairy and nuts. The patient states that she will continue to work on her diet and exercise at this time. Depending on the results of her CT cardiac the patient may be started on  a statin to help lower her cholesterol.    The patient states that she is going back to school in August to finish her bachelors and get her masters in respiratory therapy. She was hoping to start on something to help with her ADHD while she is back in school. Patient states 20 years ago she was on a low dose Adderall. At this time it was recommended that the patient start on Strattera 40 mg daily. She will follow up in 1 month to assess how she is doing on this medication.     Patient was given refills on her levothyroxine, Lotrisone cream and Lexapro during today's visit.        Review of Systems   Constitutional:  Negative for fatigue and fever.   HENT:  Negative for sore throat and trouble swallowing.    Eyes:  Negative for visual disturbance.   Respiratory:  Negative for cough and shortness of breath.    Cardiovascular:  Negative for chest pain, palpitations and leg swelling.   Gastrointestinal:  Negative for abdominal pain, constipation, diarrhea, nausea and vomiting.   Genitourinary:  Negative for dysuria and frequency.   Musculoskeletal:  Negative for arthralgias.   Skin:  Negative for rash.   Neurological:  Negative for dizziness and  "light-headedness.       Objective   Medication Documentation Review Audit       Reviewed by Sofiya Almonte MD (Physician) on 04/17/25 at 0957      Medication Order Taking? Sig Documenting Provider Last Dose Status   atomoxetine (Strattera) 40 mg capsule [id number]  Take 1 capsule (40 mg) by mouth once daily. Swallow capsule whole; do not open. If opened accidentally, do not touch eyes; wash hands immediately (product is an eye irritant). Sofiya Almonte MD  Active   BIOTIN ORAL [id number] Yes Take by mouth. Historical Provider, MD  Active    Discontinued 04/17/25 0955   clotrimazole-betamethasone (Lotrisone) cream [id number]  Apply topically 2 times a day. Sofiya Almonte MD  Active   collagen, hydrolysate, bovine, (COLLAGEN, HYDR, BOVINE,, BULK, MISC) [id number] Yes Take by mouth. Historical Provider, MD  Active   ergocalciferol (Vitamin D-2) 200 mcg/mL (8,000 unit/mL) drops [id number] Yes Take by mouth. Historical Provider, MD  Active    Discontinued 04/17/25 0955   escitalopram (Lexapro) 10 mg tablet [id number]  Take 1 tablet (10 mg) by mouth once daily. Sofiya Almonte MD  Active    Discontinued 04/17/25 0955   levothyroxine (Synthroid, Levoxyl) 125 mcg tablet [id number]  Take 1 tablet (125 mcg) by mouth once daily. Sofiya Almonte MD  Active                  Allergies[1]    /76   Pulse 67   Ht 1.651 m (5' 5\")   Wt 104 kg (229 lb 3.2 oz)   SpO2 97%   BMI 38.14 kg/m²     Physical Exam  Constitutional:       Appearance: Normal appearance. She is obese.   HENT:      Head: Normocephalic and atraumatic.      Nose: Nose normal.   Eyes:      Extraocular Movements: Extraocular movements intact.      Pupils: Pupils are equal, round, and reactive to light.   Cardiovascular:      Rate and Rhythm: Normal rate and regular rhythm.      Heart sounds: Normal heart sounds.   Pulmonary:      Effort: Pulmonary effort is normal.      Breath sounds: Normal breath sounds.   Abdominal:      General: Abdomen is flat. Bowel sounds are " normal.   Musculoskeletal:      Right lower leg: No edema.      Left lower leg: No edema.   Neurological:      General: No focal deficit present.      Mental Status: She is alert.   Psychiatric:         Mood and Affect: Mood normal.           Assessment/Plan   Problem List Items Addressed This Visit       Attention deficit disorder without hyperactivity    Patient is going back to school in August to finish her bachelor's and get her master's degree. She was previously on Adderall 20 years ago while in school. Patient was started on Strattera 40 mg daily to help her while she is in school. She will follow up in 1 month to see how she is doing on this medication.         Relevant Medications    atomoxetine (Strattera) 40 mg capsule    Eczema - Primary    Patient received refill for her Lotrisone cream to help with her eczema. She feels that the cream is very good in helping manage her eczema, especially on her hands.          Relevant Medications    clotrimazole-betamethasone (Lotrisone) cream    Esophageal reflux    Patient's GERD is well-controlled at this time. She denies any reflux symptoms.          Generalized anxiety disorder    Patient remains stable on her Lexapro 10 mg daily. She received a refill at today's visit.          Relevant Medications    escitalopram (Lexapro) 10 mg tablet    Hypercholesterolemia    Patient's lipid panel was reviewed today. Her LDL is currently 171, with a total cholesterol of 242. A CT cardiac calcium scan was ordered to further assess the patient. Patient was encouraged to continue working on a low-fat diet and cutting back on her intake of dairy and nuts. She will continue to work on her diet and exercise. Depending on the results of her CT cardiac the patient may be considered to start on a statin to help lower her cholesterol.         Hypothyroidism    Thyroid labs were reviewed with the patient. Her labs are normal, and she remains stable on her Levothyroxine 125 mcg daily.           Relevant Medications    levothyroxine (Synthroid, Levoxyl) 125 mcg tablet     Other Visit Diagnoses         Hyperlipidemia, unspecified hyperlipidemia type        Relevant Orders    CT cardiac scoring wo IV contrast        This note or encounter for this patient visit included a student.  I have independently interviewed the patient, performed a physical exam and performed my own assessment and plan and orders.             It has been a pleasure seeing you.  Sofiya Almonte MD          [1] No Known Allergies

## 2025-04-17 NOTE — ASSESSMENT & PLAN NOTE
Patient's lipid panel was reviewed today. Her LDL is currently 171, with a total cholesterol of 242. A CT cardiac calcium scan was ordered to further assess the patient. Patient was encouraged to continue working on a low-fat diet and cutting back on her intake of dairy and nuts. She will continue to work on her diet and exercise. Depending on the results of her CT cardiac the patient may be considered to start on a statin to help lower her cholesterol.

## 2025-04-17 NOTE — PATIENT INSTRUCTIONS
Follow up in Dr. Almonte in 1 month for Med check 30 min appointment Janie    Get CT cardiac calcium scan    Work on low fat diet

## 2025-04-17 NOTE — ASSESSMENT & PLAN NOTE
Thyroid labs were reviewed with the patient. Her labs are normal, and she remains stable on her Levothyroxine 125 mcg daily.

## 2025-04-17 NOTE — ASSESSMENT & PLAN NOTE
Patient received refill for her Lotrisone cream to help with her eczema. She feels that the cream is very good in helping manage her eczema, especially on her hands.

## 2025-05-02 ENCOUNTER — HOSPITAL ENCOUNTER (OUTPATIENT)
Dept: RADIOLOGY | Facility: CLINIC | Age: 40
Discharge: HOME | End: 2025-05-02
Payer: COMMERCIAL

## 2025-05-02 DIAGNOSIS — E78.5 HYPERLIPIDEMIA, UNSPECIFIED HYPERLIPIDEMIA TYPE: ICD-10-CM

## 2025-05-02 PROCEDURE — 75571 CT HRT W/O DYE W/CA TEST: CPT

## 2025-05-16 ENCOUNTER — APPOINTMENT (OUTPATIENT)
Dept: PRIMARY CARE | Facility: CLINIC | Age: 40
End: 2025-05-16
Payer: COMMERCIAL

## 2025-05-16 VITALS
DIASTOLIC BLOOD PRESSURE: 78 MMHG | HEART RATE: 66 BPM | WEIGHT: 231.8 LBS | OXYGEN SATURATION: 96 % | BODY MASS INDEX: 38.62 KG/M2 | HEIGHT: 65 IN | SYSTOLIC BLOOD PRESSURE: 112 MMHG

## 2025-05-16 DIAGNOSIS — F98.8 ATTENTION DEFICIT DISORDER WITHOUT HYPERACTIVITY: ICD-10-CM

## 2025-05-16 PROCEDURE — 99213 OFFICE O/P EST LOW 20 MIN: CPT | Performed by: INTERNAL MEDICINE

## 2025-05-16 PROCEDURE — 3008F BODY MASS INDEX DOCD: CPT | Performed by: INTERNAL MEDICINE

## 2025-05-16 PROCEDURE — 1036F TOBACCO NON-USER: CPT | Performed by: INTERNAL MEDICINE

## 2025-05-16 RX ORDER — ATOMOXETINE 80 MG/1
80 CAPSULE ORAL DAILY
Qty: 30 CAPSULE | Refills: 1 | Status: SHIPPED | OUTPATIENT
Start: 2025-05-16

## 2025-05-16 ASSESSMENT — ENCOUNTER SYMPTOMS
TROUBLE SWALLOWING: 0
CONSTIPATION: 0
DYSURIA: 0
LIGHT-HEADEDNESS: 0
FATIGUE: 0
SORE THROAT: 0
VOMITING: 0
FEVER: 0
DIZZINESS: 0
DIARRHEA: 0
PALPITATIONS: 0
SHORTNESS OF BREATH: 0
FREQUENCY: 0
COUGH: 0
ABDOMINAL PAIN: 0
NAUSEA: 0
ARTHRALGIAS: 0

## 2025-05-16 NOTE — PROGRESS NOTES
Subjective   Patient ID: Kylee Garza is a 39 y.o. female who presents for  1 month follow up for medication and general health management.    Patient is here for recheck on her ADD.  We recently started her on Strattera 40 mg daily.  She says she has not noticed any side effects and feels like just in the last week her concentration is slightly better.  Her  agrees that just in the last week or so she has started showing some very slight improvement.        Review of Systems   Constitutional:  Negative for fatigue and fever.   HENT:  Negative for sore throat and trouble swallowing.    Eyes:  Negative for visual disturbance.   Respiratory:  Negative for cough and shortness of breath.    Cardiovascular:  Negative for chest pain, palpitations and leg swelling.   Gastrointestinal:  Negative for abdominal pain, constipation, diarrhea, nausea and vomiting.   Genitourinary:  Negative for dysuria and frequency.   Musculoskeletal:  Negative for arthralgias.   Skin:  Negative for rash.   Neurological:  Negative for dizziness and light-headedness.       Objective   Medication Documentation Review Audit       Reviewed by Sofiya Almonte MD (Physician) on 05/16/25 at 0802      Medication Order Taking? Sig Documenting Provider Last Dose Status   atomoxetine (Strattera) 40 mg capsule [id number] Yes Take 1 capsule (40 mg) by mouth once daily. Swallow capsule whole; do not open. If opened accidentally, do not touch eyes; wash hands immediately (product is an eye irritant). Sofiya Almonte MD  Active   BIOTIN ORAL [id number] Yes Take by mouth. Historical Provider, MD  Active   clotrimazole-betamethasone (Lotrisone) cream [id number] Yes Apply topically 2 times a day. Sofiya Almonte MD  Active   collagen, hydrolysate, bovine, (COLLAGEN, HYDR, BOVINE,, BULK, MISC) [id number] Yes Take by mouth. Historical Provider, MD  Active   ergocalciferol (Vitamin D-2) 200 mcg/mL (8,000 unit/mL) drops [id number] Yes Take by mouth. Historical  "Naman, MD  Active   escitalopram (Lexapro) 10 mg tablet [id number] Yes Take 1 tablet (10 mg) by mouth once daily. Sofiya Almonte MD  Active   levothyroxine (Synthroid, Levoxyl) 125 mcg tablet [id number] Yes TAKE 1 TABLET BY MOUTH ONCE DAILY. Sofiya Almonte MD  Active                  Allergies[1]    /78   Pulse 66   Ht 1.651 m (5' 5\")   Wt 105 kg (231 lb 12.8 oz)   SpO2 96%   BMI 38.57 kg/m²     Physical Exam  Constitutional:       Appearance: Normal appearance.   HENT:      Head: Normocephalic and atraumatic.      Nose: Nose normal.   Eyes:      Extraocular Movements: Extraocular movements intact.      Pupils: Pupils are equal, round, and reactive to light.   Cardiovascular:      Rate and Rhythm: Normal rate and regular rhythm.   Pulmonary:      Breath sounds: Normal breath sounds.   Abdominal:      General: Abdomen is flat. Bowel sounds are normal.      Palpations: Abdomen is soft.   Musculoskeletal:      Right lower leg: No edema.      Left lower leg: No edema.   Neurological:      Mental Status: She is alert.           Assessment/Plan   Problem List Items Addressed This Visit       Attention deficit disorder without hyperactivity    Strattera appears to just be start working.  She is on a very low-dose and only 40 mg therefore we will increase it to 80 and reassess her in 1 month.         Relevant Medications    atomoxetine (Strattera) 80 mg capsule              It has been a pleasure seeing you.  Sofiya Almonte MD        [1] No Known Allergies    "

## 2025-05-16 NOTE — PATIENT INSTRUCTIONS
Increase  Straterra to 80 mg a day    Follow up Dr Almonte in 1 month 30 min appointment for ADD

## 2025-05-16 NOTE — ASSESSMENT & PLAN NOTE
Strattera appears to just be start working.  She is on a very low-dose and only 40 mg therefore we will increase it to 80 and reassess her in 1 month.

## 2025-06-25 ENCOUNTER — APPOINTMENT (OUTPATIENT)
Dept: PRIMARY CARE | Facility: CLINIC | Age: 40
End: 2025-06-25
Payer: COMMERCIAL

## 2025-06-25 VITALS
SYSTOLIC BLOOD PRESSURE: 125 MMHG | BODY MASS INDEX: 39.29 KG/M2 | HEIGHT: 65 IN | DIASTOLIC BLOOD PRESSURE: 85 MMHG | WEIGHT: 235.8 LBS | OXYGEN SATURATION: 98 % | HEART RATE: 78 BPM

## 2025-06-25 DIAGNOSIS — F98.8 ATTENTION DEFICIT DISORDER WITHOUT HYPERACTIVITY: ICD-10-CM

## 2025-06-25 DIAGNOSIS — R03.0 ELEVATED BLOOD PRESSURE READING WITHOUT DIAGNOSIS OF HYPERTENSION: Primary | ICD-10-CM

## 2025-06-25 PROCEDURE — 1036F TOBACCO NON-USER: CPT | Performed by: INTERNAL MEDICINE

## 2025-06-25 PROCEDURE — 99213 OFFICE O/P EST LOW 20 MIN: CPT | Performed by: INTERNAL MEDICINE

## 2025-06-25 PROCEDURE — 3008F BODY MASS INDEX DOCD: CPT | Performed by: INTERNAL MEDICINE

## 2025-06-25 RX ORDER — ATOMOXETINE 80 MG/1
80 CAPSULE ORAL DAILY
Qty: 30 CAPSULE | Refills: 5 | Status: SHIPPED | OUTPATIENT
Start: 2025-06-25

## 2025-06-25 ASSESSMENT — ENCOUNTER SYMPTOMS
COUGH: 0
SORE THROAT: 0
ABDOMINAL PAIN: 0
NAUSEA: 0
PALPITATIONS: 0
DIARRHEA: 0
VOMITING: 0
FREQUENCY: 0
ARTHRALGIAS: 0
DIZZINESS: 0
SHORTNESS OF BREATH: 0
DYSURIA: 0
TROUBLE SWALLOWING: 0
CONSTIPATION: 0
LIGHT-HEADEDNESS: 0
FEVER: 0
FATIGUE: 0

## 2025-06-25 ASSESSMENT — PAIN SCALES - GENERAL: PAINLEVEL_OUTOF10: 0-NO PAIN

## 2025-06-25 NOTE — ASSESSMENT & PLAN NOTE
Patient is encouraged to work on lifestyle modifications including weight loss, increased exercise and low-sodium diet.    Patient was asked to check her blood pressure once or twice a month at home and to bring those readings to her next appointment to make sure she is not developing hypertension.

## 2025-06-25 NOTE — PROGRESS NOTES
Subjective   Patient ID: Kylee Garza is a 39 y.o. female who presents for 1 month follow up for ADD and GERD management.    Patient is here to follow-up with her ADD and the Strattera as well as recheck her blood pressure.  This is the second blood pressure visit where her diastolic has been over 80.  She has been asked to increase exercise, work on weight loss and low sodium diet and we will continue to monitor this by checking blood pressure readings at home as well.      Patient states the Strattera is working much better at the 80 mg dose.  She is able to concentrate at work focus and get her work done.        Review of Systems   Constitutional:  Negative for fatigue and fever.   HENT:  Negative for sore throat and trouble swallowing.    Eyes:  Negative for visual disturbance.   Respiratory:  Negative for cough and shortness of breath.    Cardiovascular:  Negative for chest pain, palpitations and leg swelling.   Gastrointestinal:  Negative for abdominal pain, constipation, diarrhea, nausea and vomiting.   Genitourinary:  Negative for dysuria and frequency.   Musculoskeletal:  Negative for arthralgias.   Skin:  Negative for rash.   Neurological:  Negative for dizziness and light-headedness.       Objective   Medication Documentation Review Audit       Reviewed by Sofiya Almonte MD (Physician) on 06/25/25 at 0908      Medication Order Taking? Sig Documenting Provider Last Dose Status   atomoxetine (Strattera) 80 mg capsule [id number] Yes Take 1 capsule (80 mg) by mouth once daily. Swallow capsule whole; do not open. If opened accidentally, do not touch eyes; wash hands immediately (product is an eye irritant). Sofiya Almonte MD  Active   BIOTIN ORAL [id number] Yes Take by mouth. Historical Provider, MD  Active   clotrimazole-betamethasone (Lotrisone) cream [id number] Yes Apply topically 2 times a day. Sofiya Almonte MD  Active   collagen, hydrolysate, bovine, (COLLAGEN, HYDR, BOVINE,, BULK, MISC) [id number]  "Yes Take by mouth. Historical Provider, MD  Active   ergocalciferol (Vitamin D-2) 200 mcg/mL (8,000 unit/mL) drops [id number] Yes Take by mouth. Historical Provider, MD  Active   escitalopram (Lexapro) 10 mg tablet [id number] Yes Take 1 tablet (10 mg) by mouth once daily. Sofiya Almonte MD  Active   levothyroxine (Synthroid, Levoxyl) 125 mcg tablet [id number] Yes TAKE 1 TABLET BY MOUTH ONCE DAILY. Sofiya Almonte MD  Active                  Allergies[1]    /85   Pulse 78   Ht 1.651 m (5' 5\")   Wt 107 kg (235 lb 12.8 oz)   SpO2 98%   BMI 39.24 kg/m²     Physical Exam  Constitutional:       Appearance: Normal appearance.   HENT:      Head: Normocephalic and atraumatic.      Nose: Nose normal.   Eyes:      Extraocular Movements: Extraocular movements intact.      Pupils: Pupils are equal, round, and reactive to light.   Cardiovascular:      Rate and Rhythm: Normal rate and regular rhythm.   Pulmonary:      Breath sounds: Normal breath sounds.   Abdominal:      General: Abdomen is flat. Bowel sounds are normal.      Palpations: Abdomen is soft.   Musculoskeletal:      Right lower leg: No edema.      Left lower leg: No edema.   Neurological:      Mental Status: She is alert.           Assessment/Plan   Problem List Items Addressed This Visit       Attention deficit disorder without hyperactivity    Patient says the Strattera is working well.  She is able to concentrate and focus at work and get her work done.  She will continue her Strattera 80 mg daily and I will see her back in 6 months         Relevant Medications    atomoxetine (Strattera) 80 mg capsule    Elevated blood pressure reading without diagnosis of hypertension - Primary    Patient is encouraged to work on lifestyle modifications including weight loss, increased exercise and low-sodium diet.    Patient was asked to check her blood pressure once or twice a month at home and to bring those readings to her next appointment to make sure she is not " developing hypertension.                   It has been a pleasure seeing you.  Sofiya Almonte MD          [1] No Known Allergies

## 2025-06-25 NOTE — PATIENT INSTRUCTIONS
Check BP once or twice a month and bring to next appointment    Follow up Dr Almonte in 6 months for ADD  30 min appointment

## 2025-06-25 NOTE — ASSESSMENT & PLAN NOTE
Patient says the Strattera is working well.  She is able to concentrate and focus at work and get her work done.  She will continue her Strattera 80 mg daily and I will see her back in 6 months

## 2025-08-24 ENCOUNTER — ANCILLARY PROCEDURE (OUTPATIENT)
Dept: URGENT CARE | Age: 40
End: 2025-08-24
Payer: COMMERCIAL

## 2025-08-24 ENCOUNTER — OFFICE VISIT (OUTPATIENT)
Dept: URGENT CARE | Age: 40
End: 2025-08-24
Payer: COMMERCIAL

## 2025-08-24 VITALS
OXYGEN SATURATION: 99 % | SYSTOLIC BLOOD PRESSURE: 121 MMHG | WEIGHT: 220 LBS | HEIGHT: 62 IN | HEART RATE: 74 BPM | RESPIRATION RATE: 20 BRPM | BODY MASS INDEX: 40.48 KG/M2 | TEMPERATURE: 98.2 F | DIASTOLIC BLOOD PRESSURE: 85 MMHG

## 2025-08-24 DIAGNOSIS — M79.672 PAIN IN LEFT FOOT: ICD-10-CM

## 2025-08-24 DIAGNOSIS — S92.503A: Primary | ICD-10-CM

## 2025-08-24 PROCEDURE — 1036F TOBACCO NON-USER: CPT

## 2025-08-24 PROCEDURE — 73630 X-RAY EXAM OF FOOT: CPT | Mod: LEFT SIDE

## 2025-08-24 PROCEDURE — 3008F BODY MASS INDEX DOCD: CPT

## 2025-08-24 PROCEDURE — 99203 OFFICE O/P NEW LOW 30 MIN: CPT

## 2025-12-15 ENCOUNTER — APPOINTMENT (OUTPATIENT)
Dept: PRIMARY CARE | Facility: CLINIC | Age: 40
End: 2025-12-15
Payer: COMMERCIAL